# Patient Record
Sex: MALE | Race: NATIVE HAWAIIAN OR OTHER PACIFIC ISLANDER
[De-identification: names, ages, dates, MRNs, and addresses within clinical notes are randomized per-mention and may not be internally consistent; named-entity substitution may affect disease eponyms.]

---

## 2019-01-07 ENCOUNTER — HOSPITAL ENCOUNTER (OUTPATIENT)
Dept: HOSPITAL 76 - DI | Age: 29
Discharge: HOME | End: 2019-01-07
Attending: ORTHOPAEDIC SURGERY
Payer: COMMERCIAL

## 2019-01-07 DIAGNOSIS — S83.272A: Primary | ICD-10-CM

## 2019-01-07 NOTE — MRI REPORT
Reason:  KNEE PAIN,LEFT,ACUTE

Procedure Date:  01/07/2019   

Accession Number:  [id number] / [id number]                    

Procedure:  MRI - Knee LT W/O CPT Code:  

 

FULL RESULT:

 

 

EXAM:

LEFT KNEE MRI WITHOUT CONTRAST

 

EXAM DATE: 1/7/2019 11:49 AM.

 

CLINICAL HISTORY: Knee pain, left, acute.

 

COMPARISON: KNEE 4 VIEW LT 12/09/2018 1:14 PM.

 

TECHNIQUE: Multiplanar, multisequence T1-weighted and fluid-sensitive 

sequences of the knee without contrast. Other: None.

 

FINDINGS:

Bones: No fractures or subluxations. No marrow edema. No bone lesions.

 

Articular Cartilage: Unremarkable.

 

Medial Meniscus: The medial meniscus is intact.

 

Lateral Meniscus: Complex tear lateral meniscus body.

 

Cruciate Ligaments: The anterior and posterior cruciate ligaments are 

intact.

 

Collateral Ligaments: The medial collateral and lateral collateral 

ligamentous structures are intact.

 

Tendons: The quadriceps, patellar, semimembranosus, and popliteus tendons 

are unremarkable.

 

Musculature: No edema or fatty atrophy.

 

Other: No effusion. No popliteal cyst. No loose bodies. The medial and 

lateral retinacula are intact. The subcutaneous tissues and fat pads are 

unremarkable.

IMPRESSION: Complex tear lateral meniscus body.

 

[facility] MUSCULOSKELETAL RADIOLOGY SECTION